# Patient Record
Sex: FEMALE | Race: BLACK OR AFRICAN AMERICAN | NOT HISPANIC OR LATINO | URBAN - METROPOLITAN AREA
[De-identification: names, ages, dates, MRNs, and addresses within clinical notes are randomized per-mention and may not be internally consistent; named-entity substitution may affect disease eponyms.]

---

## 2023-06-08 ENCOUNTER — APPOINTMENT (OUTPATIENT)
Dept: LAB | Facility: LAB | Age: 28
End: 2023-06-08

## 2023-06-08 LAB
CLUE CELLS: PRESENT
HEPATITIS B VIRUS SURFACE AG PRESENCE IN SERUM: NONREACTIVE
HEPATITIS C VIRUS AB PRESENCE IN SERUM: NONREACTIVE
HIV 1/ 2 AG/AB SCREEN: NONREACTIVE
NUGENT SCORE: 8
SYPHILIS TOTAL AB: NONREACTIVE
YEAST: ABNORMAL

## 2023-06-09 LAB
CHLAMYDIA TRACH., AMPLIFIED: NEGATIVE
N. GONORRHEA, AMPLIFIED: NEGATIVE
TRICHOMONAS VAGINALIS: NEGATIVE

## 2024-02-12 ENCOUNTER — HOSPITAL ENCOUNTER (EMERGENCY)
Facility: HOSPITAL | Age: 29
Discharge: HOME | End: 2024-02-12

## 2024-02-12 VITALS
OXYGEN SATURATION: 100 % | BODY MASS INDEX: 25.71 KG/M2 | HEART RATE: 77 BPM | TEMPERATURE: 97.9 F | RESPIRATION RATE: 14 BRPM | HEIGHT: 66 IN | DIASTOLIC BLOOD PRESSURE: 84 MMHG | SYSTOLIC BLOOD PRESSURE: 130 MMHG | WEIGHT: 160 LBS

## 2024-02-12 DIAGNOSIS — Z11.3 SCREEN FOR STD (SEXUALLY TRANSMITTED DISEASE): Primary | ICD-10-CM

## 2024-02-12 LAB
APPEARANCE UR: ABNORMAL
BILIRUB UR STRIP.AUTO-MCNC: NEGATIVE MG/DL
CLUE CELLS SPEC QL WET PREP: NORMAL
COLOR UR: ABNORMAL
FLUAV RNA RESP QL NAA+PROBE: NOT DETECTED
FLUBV RNA RESP QL NAA+PROBE: NOT DETECTED
GLUCOSE UR STRIP.AUTO-MCNC: NORMAL MG/DL
HCV AB SER QL: NONREACTIVE
HIV 1+2 AB+HIV1 P24 AG SERPL QL IA: NONREACTIVE
HOLD SPECIMEN: NORMAL
KETONES UR STRIP.AUTO-MCNC: NEGATIVE MG/DL
LEUKOCYTE ESTERASE UR QL STRIP.AUTO: NEGATIVE
NITRITE UR QL STRIP.AUTO: NEGATIVE
PH UR STRIP.AUTO: 6 [PH]
PREGNANCY TEST URINE, POC: NEGATIVE
PROT UR STRIP.AUTO-MCNC: NEGATIVE MG/DL
RBC # UR STRIP.AUTO: NEGATIVE /UL
SARS-COV-2 RNA RESP QL NAA+PROBE: NOT DETECTED
SP GR UR STRIP.AUTO: 1.01
T VAGINALIS SPEC QL WET PREP: NORMAL
TREPONEMA PALLIDUM IGG+IGM AB [PRESENCE] IN SERUM OR PLASMA BY IMMUNOASSAY: NONREACTIVE
TRICHOMONAS REFLEX COMMENT: NORMAL
UROBILINOGEN UR STRIP.AUTO-MCNC: NORMAL MG/DL
WBC VAG QL WET PREP: NORMAL
YEAST VAG QL WET PREP: NORMAL

## 2024-02-12 PROCEDURE — 86803 HEPATITIS C AB TEST: CPT | Performed by: PHYSICIAN ASSISTANT

## 2024-02-12 PROCEDURE — 81025 URINE PREGNANCY TEST: CPT | Performed by: PHYSICIAN ASSISTANT

## 2024-02-12 PROCEDURE — 36415 COLL VENOUS BLD VENIPUNCTURE: CPT | Performed by: PHYSICIAN ASSISTANT

## 2024-02-12 PROCEDURE — 86780 TREPONEMA PALLIDUM: CPT | Performed by: PHYSICIAN ASSISTANT

## 2024-02-12 PROCEDURE — 96372 THER/PROPH/DIAG INJ SC/IM: CPT

## 2024-02-12 PROCEDURE — 87661 TRICHOMONAS VAGINALIS AMPLIF: CPT | Performed by: PHYSICIAN ASSISTANT

## 2024-02-12 PROCEDURE — 87210 SMEAR WET MOUNT SALINE/INK: CPT | Performed by: PHYSICIAN ASSISTANT

## 2024-02-12 PROCEDURE — 2500000005 HC RX 250 GENERAL PHARMACY W/O HCPCS: Performed by: PHYSICIAN ASSISTANT

## 2024-02-12 PROCEDURE — 99284 EMERGENCY DEPT VISIT MOD MDM: CPT

## 2024-02-12 PROCEDURE — 87636 SARSCOV2 & INF A&B AMP PRB: CPT | Performed by: PHYSICIAN ASSISTANT

## 2024-02-12 PROCEDURE — 99283 EMERGENCY DEPT VISIT LOW MDM: CPT

## 2024-02-12 PROCEDURE — 87800 DETECT AGNT MULT DNA DIREC: CPT | Performed by: PHYSICIAN ASSISTANT

## 2024-02-12 PROCEDURE — 81003 URINALYSIS AUTO W/O SCOPE: CPT | Performed by: PHYSICIAN ASSISTANT

## 2024-02-12 PROCEDURE — 99284 EMERGENCY DEPT VISIT MOD MDM: CPT | Performed by: PHYSICIAN ASSISTANT

## 2024-02-12 PROCEDURE — 2500000004 HC RX 250 GENERAL PHARMACY W/ HCPCS (ALT 636 FOR OP/ED): Performed by: PHYSICIAN ASSISTANT

## 2024-02-12 PROCEDURE — 87389 HIV-1 AG W/HIV-1&-2 AB AG IA: CPT | Performed by: PHYSICIAN ASSISTANT

## 2024-02-12 PROCEDURE — 2500000001 HC RX 250 WO HCPCS SELF ADMINISTERED DRUGS (ALT 637 FOR MEDICARE OP): Performed by: PHYSICIAN ASSISTANT

## 2024-02-12 RX ORDER — ONDANSETRON 4 MG/1
4 TABLET, ORALLY DISINTEGRATING ORAL EVERY 8 HOURS PRN
Qty: 21 TABLET | Refills: 0 | Status: SHIPPED | OUTPATIENT
Start: 2024-02-12

## 2024-02-12 RX ORDER — IBUPROFEN 600 MG/1
600 TABLET ORAL EVERY 6 HOURS PRN
Qty: 20 TABLET | Refills: 0 | Status: SHIPPED | OUTPATIENT
Start: 2024-02-12 | End: 2024-02-17

## 2024-02-12 RX ORDER — ACETAMINOPHEN 325 MG/1
650 TABLET ORAL EVERY 6 HOURS PRN
Qty: 20 TABLET | Refills: 0 | Status: SHIPPED | OUTPATIENT
Start: 2024-02-12 | End: 2024-02-17

## 2024-02-12 RX ORDER — CEFTRIAXONE 500 MG/1
500 INJECTION, POWDER, FOR SOLUTION INTRAMUSCULAR; INTRAVENOUS ONCE
Status: COMPLETED | OUTPATIENT
Start: 2024-02-12 | End: 2024-02-12

## 2024-02-12 RX ORDER — DOXYCYCLINE 100 MG/1
100 TABLET ORAL 2 TIMES DAILY
Qty: 14 TABLET | Refills: 0 | Status: SHIPPED | OUTPATIENT
Start: 2024-02-12 | End: 2024-02-19

## 2024-02-12 RX ORDER — ONDANSETRON 4 MG/1
4 TABLET, ORALLY DISINTEGRATING ORAL ONCE
Status: COMPLETED | OUTPATIENT
Start: 2024-02-12 | End: 2024-02-12

## 2024-02-12 RX ORDER — IBUPROFEN 600 MG/1
600 TABLET ORAL ONCE
Status: COMPLETED | OUTPATIENT
Start: 2024-02-12 | End: 2024-02-12

## 2024-02-12 RX ORDER — DOXYCYCLINE HYCLATE 100 MG
100 TABLET ORAL ONCE
Status: COMPLETED | OUTPATIENT
Start: 2024-02-12 | End: 2024-02-12

## 2024-02-12 RX ADMIN — DOXYCYCLINE HYCLATE 100 MG: 100 TABLET, COATED ORAL at 08:47

## 2024-02-12 RX ADMIN — CEFTRIAXONE SODIUM 500 MG: 500 INJECTION, POWDER, FOR SOLUTION INTRAMUSCULAR; INTRAVENOUS at 08:47

## 2024-02-12 RX ADMIN — IBUPROFEN 600 MG: 600 TABLET, FILM COATED ORAL at 08:04

## 2024-02-12 RX ADMIN — ONDANSETRON 4 MG: 4 TABLET, ORALLY DISINTEGRATING ORAL at 09:28

## 2024-02-12 ASSESSMENT — COLUMBIA-SUICIDE SEVERITY RATING SCALE - C-SSRS
6. HAVE YOU EVER DONE ANYTHING, STARTED TO DO ANYTHING, OR PREPARED TO DO ANYTHING TO END YOUR LIFE?: NO
1. IN THE PAST MONTH, HAVE YOU WISHED YOU WERE DEAD OR WISHED YOU COULD GO TO SLEEP AND NOT WAKE UP?: NO
2. HAVE YOU ACTUALLY HAD ANY THOUGHTS OF KILLING YOURSELF?: NO

## 2024-02-12 ASSESSMENT — PAIN - FUNCTIONAL ASSESSMENT: PAIN_FUNCTIONAL_ASSESSMENT: 0-10

## 2024-02-12 ASSESSMENT — PAIN SCALES - GENERAL: PAINLEVEL_OUTOF10: 9

## 2024-02-12 ASSESSMENT — PAIN DESCRIPTION - LOCATION: LOCATION: LEG

## 2024-02-12 NOTE — ED TRIAGE NOTES
Pt came in for an STD check after having stabbing vaginal pain and white discharge for about 1.5 weeks. Pt reports having muscle aches and chills that started about 5 days ago but has worsened today.

## 2024-02-12 NOTE — ED PROVIDER NOTES
"This is a 28-year-old female with no significant past medical history presents to the ED with vaginal symptoms for the past 10 days.  She endorses having vaginal discharge that she states has been thick and white in nature, vaginal pain that is worse with intercourse, pelvic cramping.  She also states over the past 3 to 4 days she developed generalized malaise and bodyaches as well as subjective fevers and chills, nasal congestion, rhinorrhea, and mild dry cough.  She denies any known sick contacts.  She is sexually active with 1 male partner and does not use protection but is concerned that he may have other partners.  She denies any known exposure to STDs.  She did not take anything at home for her symptoms.  She states that she went to an urgent care and was told that she did not have bacterial vaginosis, however did not hear about any of her other results and is still concerned she has an STD.  She is requesting blood testing for STDs.      History provided by:  Patient   used: No             Visit Vitals  /84 (BP Location: Left arm)   Pulse 77   Temp 36.6 °C (97.9 °F) (Oral)   Resp 14   Ht 1.676 m (5' 6\")   Wt 72.6 kg (160 lb)   SpO2 100%   BMI 25.82 kg/m²   BSA 1.84 m²          Physical Exam     Physical exam:    General: Vitals noted, no distress. Afebrile.    EENT: PERRL, EOM's intact. Eye lids without lesions. No scleral icterus. Normal phonation. Nares patent. MMM.     Cardiac: Regular, rate, rhythm, no murmur.    Pulmonary: Lungs clear bilaterally with good aeration. No adventitious breath sounds.    Abdomen: Mild tenderness palpation suprapubic region.  No rebound or guarding.  No tenderness at McBurney's point.  Soft, nonsurgical. No peritoneal signs. Normoactive bowel sounds. No CVA tenderness.     Pelvic Exam: Chaperone present. External genitalia normal. No rashes or lesions. Speculum exam shows no lesions on the cervix.  Small amount of thick white discharge within the " vagina. no bleeding. Closed cervical os. Bimanual exam shows no adnexal pain or mass. No cervical motion tenderness.    Extremities: No peripheral edema. Full range of motion. Moves all extremities freely.     Skin: No rash. Warm and dry. No discoloration noted.     Neuro: No focal neurologic deficits noted.  Pt is A&O x3, speech is clear, moves all extremities independently sensation is intact.            Labs Reviewed   URINALYSIS WITH REFLEX CULTURE AND MICROSCOPIC - Abnormal       Result Value    Color, Urine Light-Yellow      Appearance, Urine Turbid (*)     Specific Gravity, Urine 1.012      pH, Urine 6.0      Protein, Urine NEGATIVE      Glucose, Urine Normal      Blood, Urine NEGATIVE      Ketones, Urine NEGATIVE      Bilirubin, Urine NEGATIVE      Urobilinogen, Urine Normal      Nitrite, Urine NEGATIVE      Leukocyte Esterase, Urine NEGATIVE     SARS-COV-2 AND INFLUENZA A/B PCR - Normal    Flu A Result Not Detected      Flu B Result Not Detected      Coronavirus 2019, PCR Not Detected      Narrative:     This assay has received FDA Emergency Use Authorization (EUA) and  is only authorized for the duration of time that circumstances exist to justify the authorization of the emergency use of in vitro diagnostic tests for the detection of SARS-CoV-2 virus and/or diagnosis of COVID-19 infection under section 564(b)(1) of the Act, 21 U.S.C. 360bbb-3(b)(1). Testing for SARS-CoV-2 is only recommended for patients who meet current clinical and/or epidemiological criteria as defined by federal, state, or local public health directives. This assay is an in vitro diagnostic nucleic acid amplification test for the qualitative detection of SARS-CoV-2, Influenza A, and Influenza B from nasopharyngeal specimens and has been validated for use at [facility]. Negative results do not preclude COVID-19 infections or Influenza A/B infections, and should not be used as the sole basis for diagnosis,  treatment, or other management decisions. If Influenza A/B and RSV PCR results are negative, testing for Parainfluenza virus, Adenovirus and Metapneumovirus is routinely performed for [facility] pediatric oncology and intensive care inpatients, and is available on other patients by placing an add-on request.    HIV 1/2 ANTIGEN/ANTIBODY SCREEN WIH REFLEX TO CONFIRMATION - Normal    HIV 1/2 Antigen/Antibody Screen with Reflex to Confirmation Nonreactive      Narrative:     HIV Ag/Ab screen is performed using the Siemens [employer] HIV Ag/Ab Combo assay which detects the presence of HIV p24 antigen as well as antibodies to HIV-1 (Group M and O) and HIV-2.  No laboratory evidence of HIV infection. If acute HIV infection is suspected, consider testing for HIV RNA by PCR (viral load).   HEPATITIS C ANTIBODY - Normal    Hepatitis C AB Nonreactive     POCT PREGNANCY, URINE - Normal    Preg Test, Ur Negative     TRICHOMONAS WET PREP REFLEX TO PCR    Trichomonas None Seen      Clue Cells None Seen      Yeast None Seen      WBC 3-8      Trichomonas reflex comment        Value: Trichomonas was not seen by wet prep. Reflex Trichomonas vaginalis by Amplified Detection.   SYPHILIS SCREENING WITH REFLEX   C. TRACHOMATIS + N. GONORRHOEAE, AMPLIFIED   URINALYSIS WITH REFLEX CULTURE AND MICROSCOPIC    Narrative:     The following orders were created for panel order Urinalysis with Reflex Culture and Microscopic.  Procedure                               Abnormality         Status                     ---------                               -----------         ------                     Urinalysis with Reflex C...[[id number]]  Abnormal            Final result               Extra Urine Gray Tube[[id number]]                            In process                   Please view results for these tests on the individual orders.   EXTRA URINE GRAY TUBE   TRICH VAGINALIS, AMPLIFIED       No orders to display         ED Course & MDM     Medical Decision  Making  This is a 28-year-old female with no significant past medical history presents to the ED with vaginal symptoms as well as body aches and flulike symptoms.  Vital stable upon arrival to the ED.  On examination she is overall well-appearing.  Lungs clear to auscultation.  Abdomen soft with minimal tenderness palpation to her suprapubic region.  No rebound or guarding.  Pelvic examination performed chaperone present and showed a moderate amount of thick white discharge within the vagina.  Wet prep as well as GC chlamydia swabs obtained during pelvic examination.  No cervical motion tenderness.  No adnexal pain or masses palpated.  Based on pelvic examination I have low suspicion for PID/TOA at this time.  Blood sample also obtained to screen for HIV, hepatitis C, syphilis.  Viral swab obtained to screen for flu/COVID.  Viral swab negative for flu and COVID.  Wet prep negative.  Urinalysis not concerning for UTI. Urine pregnancy test was negative.  Patient elected to be empirically treated for STDs and was given IM Rocephin as well as her first dose of doxycycline.  She was written a prescription for doxycycline to go home with.  Following the doxycycline she did endorse some nausea but had no vomiting was ordered ODT Zofran.  She was written a prescription for ODT Zofran, doxycycline, Tylenol and Motrin for her symptoms at home and was advised to follow with her OB/GYN.  She was agreeable to this plan.  She was given signs symptoms to return to the ED with and was discharged in stable condition.  She was told that she would receive a phone call in 2-3 business days with any positive results on her STD testing.    Amount and/or Complexity of Data Reviewed  Labs: ordered.    Risk  Prescription drug management.         ED Course as of 02/12/24 1004   Mon Feb 12, 2024   0840 Trichomonas Wet Prep Reflex to PCR  Wet prep negative for BV, yeast, and trichomonas [AW]      ED Course User Index  [AW] Crystal Abreu,  TINO         Diagnoses as of 02/12/24 1004   Screen for STD (sexually transmitted disease)       Procedures    FADIA Rosas, TINO Abreu PA-C  02/12/24 1006

## 2024-02-13 LAB
C TRACH RRNA SPEC QL NAA+PROBE: NEGATIVE
N GONORRHOEA DNA SPEC QL PROBE+SIG AMP: NEGATIVE
T VAGINALIS RRNA SPEC QL NAA+PROBE: NEGATIVE

## 2024-02-26 ENCOUNTER — DOCUMENTATION (OUTPATIENT)
Dept: EMERGENCY MEDICINE | Facility: HOSPITAL | Age: 29
End: 2024-02-26

## 2024-02-26 NOTE — PROGRESS NOTES
2/26/2024     Test:   Discussed HIV and HCV testing with the patient in the ED.   Patient received HIV and HCV test today    Test Result:  HIV Negative  HCV Negative  SYPHILIS Negative    Result notification:  Patient was notified of their test results on 02/26/24 via Telephone (after verifying 3 identifiers)    Follow-up plan:   Patient was referred to Other on [Date]  Patient has appointment at [clinic name] on [Date]  Barriers to attending appointment: [free text, none]  Missed appointments: [unfilled]     Signed  RON Neil   HIV/HCV [facility]  Certified Community Health Worker - Research  Please contact me via email or [facility]EXhart with questions or phone [phone]

## 2024-11-12 ENCOUNTER — HOSPITAL ENCOUNTER (EMERGENCY)
Facility: HOSPITAL | Age: 29
Discharge: HOME | End: 2024-11-12
Payer: MEDICAID

## 2024-11-12 VITALS
BODY MASS INDEX: 28.93 KG/M2 | DIASTOLIC BLOOD PRESSURE: 71 MMHG | SYSTOLIC BLOOD PRESSURE: 118 MMHG | OXYGEN SATURATION: 98 % | HEIGHT: 66 IN | WEIGHT: 180 LBS | RESPIRATION RATE: 16 BRPM | HEART RATE: 94 BPM | TEMPERATURE: 98.6 F

## 2024-11-12 DIAGNOSIS — J45.909 UNCOMPLICATED ASTHMA, UNSPECIFIED ASTHMA SEVERITY, UNSPECIFIED WHETHER PERSISTENT (HHS-HCC): ICD-10-CM

## 2024-11-12 DIAGNOSIS — J40 BRONCHITIS: Primary | ICD-10-CM

## 2024-11-12 LAB
ATRIAL RATE: 98 BPM
P AXIS: 78 DEGREES
P OFFSET: 201 MS
P ONSET: 149 MS
PR INTERVAL: 142 MS
Q ONSET: 220 MS
QRS COUNT: 16 BEATS
QRS DURATION: 80 MS
QT INTERVAL: 298 MS
QTC CALCULATION(BAZETT): 380 MS
QTC FREDERICIA: 351 MS
R AXIS: 67 DEGREES
T AXIS: -7 DEGREES
T OFFSET: 369 MS
VENTRICULAR RATE: 98 BPM

## 2024-11-12 PROCEDURE — 2500000002 HC RX 250 W HCPCS SELF ADMINISTERED DRUGS (ALT 637 FOR MEDICARE OP, ALT 636 FOR OP/ED): Mod: SE | Performed by: NURSE PRACTITIONER

## 2024-11-12 PROCEDURE — 93010 ELECTROCARDIOGRAM REPORT: CPT

## 2024-11-12 PROCEDURE — 2500000004 HC RX 250 GENERAL PHARMACY W/ HCPCS (ALT 636 FOR OP/ED): Mod: SE | Performed by: NURSE PRACTITIONER

## 2024-11-12 PROCEDURE — 99285 EMERGENCY DEPT VISIT HI MDM: CPT

## 2024-11-12 PROCEDURE — 99284 EMERGENCY DEPT VISIT MOD MDM: CPT | Performed by: NURSE PRACTITIONER

## 2024-11-12 PROCEDURE — 94640 AIRWAY INHALATION TREATMENT: CPT | Mod: 59

## 2024-11-12 RX ORDER — PREDNISONE 20 MG/1
40 TABLET ORAL ONCE
Status: COMPLETED | OUTPATIENT
Start: 2024-11-12 | End: 2024-11-12

## 2024-11-12 RX ORDER — ALBUTEROL SULFATE 90 UG/1
2 INHALANT RESPIRATORY (INHALATION) EVERY 6 HOURS PRN
Qty: 18 G | Refills: 0 | Status: SHIPPED | OUTPATIENT
Start: 2024-11-12 | End: 2025-11-12

## 2024-11-12 RX ORDER — BENZONATATE 100 MG/1
100 CAPSULE ORAL 3 TIMES DAILY PRN
Qty: 21 CAPSULE | Refills: 0 | Status: SHIPPED | OUTPATIENT
Start: 2024-11-12 | End: 2024-11-19

## 2024-11-12 RX ORDER — PREDNISONE 20 MG/1
40 TABLET ORAL DAILY
Qty: 8 TABLET | Refills: 0 | Status: SHIPPED | OUTPATIENT
Start: 2024-11-12

## 2024-11-12 RX ORDER — IPRATROPIUM BROMIDE AND ALBUTEROL SULFATE 2.5; .5 MG/3ML; MG/3ML
3 SOLUTION RESPIRATORY (INHALATION) EVERY 20 MIN
Status: COMPLETED | OUTPATIENT
Start: 2024-11-12 | End: 2024-11-12

## 2024-11-12 ASSESSMENT — COLUMBIA-SUICIDE SEVERITY RATING SCALE - C-SSRS
2. HAVE YOU ACTUALLY HAD ANY THOUGHTS OF KILLING YOURSELF?: NO
6. HAVE YOU EVER DONE ANYTHING, STARTED TO DO ANYTHING, OR PREPARED TO DO ANYTHING TO END YOUR LIFE?: NO
1. IN THE PAST MONTH, HAVE YOU WISHED YOU WERE DEAD OR WISHED YOU COULD GO TO SLEEP AND NOT WAKE UP?: NO

## 2024-11-12 ASSESSMENT — LIFESTYLE VARIABLES
EVER HAD A DRINK FIRST THING IN THE MORNING TO STEADY YOUR NERVES TO GET RID OF A HANGOVER: NO
EVER FELT BAD OR GUILTY ABOUT YOUR DRINKING: NO
TOTAL SCORE: 0
HAVE PEOPLE ANNOYED YOU BY CRITICIZING YOUR DRINKING: NO
HAVE YOU EVER FELT YOU SHOULD CUT DOWN ON YOUR DRINKING: NO

## 2024-11-12 ASSESSMENT — PAIN - FUNCTIONAL ASSESSMENT: PAIN_FUNCTIONAL_ASSESSMENT: 0-10

## 2024-11-12 NOTE — ED TRIAGE NOTES
Pt to ed with complaints of sob x 1 week. Pt states she has been introduced to a cold from a friend and has been feeling sick ever since. Pt has hx of chronic bronchitis and gets this usually every year around this time. Pt breath sounds clear, vitals stable, airway intact. Pt denies cp, has non productive cough

## 2024-11-12 NOTE — ED PROVIDER NOTES
HPI   Chief Complaint   Patient presents with    Shortness of Breath       HPI  This is a 29 year old female with past medical history significant for bronchitis presents to the ED with wheezing and dyspnea for the past couple of days.   She does not have an established diagnosis asthma. She has had clear sputum and denies fever , chills, nausea, vomiting, sick contact and and/or recent travel.       Patient History   Past Medical History:   Diagnosis Date    Contact with and (suspected) exposure to infections with a predominantly sexual mode of transmission 2018    Exposure to STD    Encounter for contraceptive management, unspecified 2018    Contraception management    Encounter for gynecological examination (general) (routine) without abnormal findings 2017    Encounter for gynecological examination without abnormal finding    Encounter for initial prescription of injectable contraceptive 2017    Encounter for initial prescription of injectable contraceptive    Encounter for screening for infections with a predominantly sexual mode of transmission 2017    Screening for STD (sexually transmitted disease)    Encounter for surveillance of injectable contraceptive 10/11/2017    Encounter for Depo-Provera contraception    Gonococcal infection, unspecified 2018    Gonorrhea in female    Other specified health status     No pertinent past medical history    Personal history of other infectious and parasitic diseases 2018    History of candidiasis     Past Surgical History:   Procedure Laterality Date     SECTION, CLASSIC  2017     Section     No family history on file.  Social History     Tobacco Use    Smoking status: Not on file    Smokeless tobacco: Not on file   Substance Use Topics    Alcohol use: Not on file    Drug use: Not on file       Physical Exam   ED Triage Vitals [24 1025]   Temperature Heart Rate Respirations BP   37 °C (98.6 °F) (!)  101 16 121/80      Pulse Ox Temp Source Heart Rate Source Patient Position   98 % Tympanic -- --      BP Location FiO2 (%)     -- --       Physical Exam  Constitutional:       Appearance: She is well-developed.   HENT:      Head: Normocephalic and atraumatic.   Cardiovascular:      Rate and Rhythm: Tachycardia present.   Pulmonary:      Breath sounds: Wheezing present.   Abdominal:      Palpations: Abdomen is soft.   Musculoskeletal:         General: Normal range of motion.      Cervical back: Normal range of motion.   Skin:     General: Skin is warm and dry.   Neurological:      General: No focal deficit present.      Mental Status: She is alert and oriented to person, place, and time.   Psychiatric:         Mood and Affect: Mood normal.         Behavior: Behavior normal.           ED Course & MDM   Diagnoses as of 11/12/24 1159   Bronchitis   Uncomplicated asthma, unspecified asthma severity, unspecified whether persistent ([facility])                 No data recorded     [place] Coma Scale Score: 15 (11/12/24 1049 : Audie Polk RN)                           Medical Decision Making  This is a 29 year old female with past medical history significant for bronchitis presents to the ED with wheezing and dyspnea for the past couple of days.   She does not have an established diagnosis asthma. She has had clear sputum and denies fever , chills, nausea, vomiting, sick contact and and/or recent travel.   She was given three Duoneb and 40 mg of Prednisone.     She felt significantly better after her aerosol treatments and Prednisone. Her lungs also sounded much improved with increase air movement,   She was discharged home with Rx's for Prednisone 40 mg x 4 more days, Albuterol MDI q6 as needed and Tessalon. She was instructed to follow up with a primary care for management and evaluation of what I think maybe asthma.     Procedure  Procedures     Estefania Fortune, APRN-CNP, DNP  11/12/24 4585

## 2024-12-18 ENCOUNTER — OFFICE VISIT (OUTPATIENT)
Dept: URGENT CARE | Age: 29
End: 2024-12-18
Payer: MEDICAID

## 2024-12-18 VITALS
WEIGHT: 170 LBS | HEIGHT: 66 IN | DIASTOLIC BLOOD PRESSURE: 73 MMHG | TEMPERATURE: 98.5 F | RESPIRATION RATE: 18 BRPM | BODY MASS INDEX: 27.32 KG/M2 | SYSTOLIC BLOOD PRESSURE: 112 MMHG

## 2024-12-18 DIAGNOSIS — N89.8 VAGINAL ODOR: ICD-10-CM

## 2024-12-18 LAB
POC APPEARANCE, URINE: ABNORMAL
POC BILIRUBIN, URINE: NEGATIVE
POC BLOOD, URINE: ABNORMAL
POC COLOR, URINE: ABNORMAL
POC GLUCOSE, URINE: NEGATIVE MG/DL
POC KETONES, URINE: NEGATIVE MG/DL
POC LEUKOCYTES, URINE: NEGATIVE
POC NITRITE,URINE: NEGATIVE
POC PH, URINE: 6 PH
POC PROTEIN, URINE: NEGATIVE MG/DL
POC SPECIFIC GRAVITY, URINE: 1.02
POC UROBILINOGEN, URINE: 1 EU/DL
PREGNANCY TEST URINE, POC: NEGATIVE

## 2024-12-18 PROCEDURE — 87591 N.GONORRHOEAE DNA AMP PROB: CPT

## 2024-12-18 PROCEDURE — 99204 OFFICE O/P NEW MOD 45 MIN: CPT | Performed by: PHYSICIAN ASSISTANT

## 2024-12-18 PROCEDURE — 81003 URINALYSIS AUTO W/O SCOPE: CPT | Performed by: PHYSICIAN ASSISTANT

## 2024-12-18 PROCEDURE — 3008F BODY MASS INDEX DOCD: CPT | Performed by: PHYSICIAN ASSISTANT

## 2024-12-18 PROCEDURE — 87205 SMEAR GRAM STAIN: CPT

## 2024-12-18 PROCEDURE — 87491 CHLMYD TRACH DNA AMP PROBE: CPT

## 2024-12-18 PROCEDURE — 81025 URINE PREGNANCY TEST: CPT | Performed by: PHYSICIAN ASSISTANT

## 2024-12-18 RX ORDER — METRONIDAZOLE 500 MG/1
500 TABLET ORAL 2 TIMES DAILY
Qty: 14 TABLET | Refills: 0 | Status: SHIPPED | OUTPATIENT
Start: 2024-12-18 | End: 2024-12-25

## 2024-12-18 RX ORDER — FLUCONAZOLE 150 MG/1
150 TABLET ORAL ONCE
Qty: 1 TABLET | Refills: 0 | Status: SHIPPED | OUTPATIENT
Start: 2024-12-18 | End: 2024-12-18

## 2024-12-18 ASSESSMENT — ENCOUNTER SYMPTOMS
RESPIRATORY NEGATIVE: 1
ABDOMINAL PAIN: 1
CARDIOVASCULAR NEGATIVE: 1
ENDOCRINE NEGATIVE: 1
HEMATOLOGIC/LYMPHATIC NEGATIVE: 1
BACK PAIN: 0
DYSURIA: 0
CONSTITUTIONAL NEGATIVE: 1
ALLERGIC/IMMUNOLOGIC NEGATIVE: 1
NEUROLOGICAL NEGATIVE: 1
EYES NEGATIVE: 1
PSYCHIATRIC NEGATIVE: 1

## 2024-12-19 LAB
BACTERIAL VAGINOSIS VAG-IMP: NORMAL
C TRACH RRNA SPEC QL NAA+PROBE: NEGATIVE
CLUE CELLS VAG LPF-#/AREA: NORMAL /[LPF]
N GONORRHOEA DNA SPEC QL PROBE+SIG AMP: NEGATIVE
NUGENT SCORE: 6
YEAST VAG WET PREP-#/AREA: NORMAL

## 2024-12-19 NOTE — PROGRESS NOTES
Subjective   Patient ID: Quyen Gilbert is a 29 y.o. female. They present today with a chief complaint of Other (Took a shower this evening and noticed an odor).    History of Present Illness    History provided by:  Patient   used: No      This is a female pt here for vaginal odor, lower abdominal cramping, and slight vaginal itching noted tonight. Denies back pain, dysuria, fever, genital rash or lesions. Vaginal spotting for months after off depoprovera.   Past Medical History  Allergies as of 2024    (No Known Allergies)       (Not in a hospital admission)       Past Medical History:   Diagnosis Date    Contact with and (suspected) exposure to infections with a predominantly sexual mode of transmission 2018    Exposure to STD    Encounter for contraceptive management, unspecified 2018    Contraception management    Encounter for gynecological examination (general) (routine) without abnormal findings 2017    Encounter for gynecological examination without abnormal finding    Encounter for initial prescription of injectable contraceptive 2017    Encounter for initial prescription of injectable contraceptive    Encounter for screening for infections with a predominantly sexual mode of transmission 2017    Screening for STD (sexually transmitted disease)    Encounter for surveillance of injectable contraceptive 10/11/2017    Encounter for Depo-Provera contraception    Gonococcal infection, unspecified 2018    Gonorrhea in female    Other specified health status     No pertinent past medical history    Personal history of other infectious and parasitic diseases 2018    History of candidiasis       Past Surgical History:   Procedure Laterality Date     SECTION, CLASSIC  2017     Section        reports that she has never smoked. She does not have any smokeless tobacco history on file.    Review of Systems  Review of  "Systems   Constitutional: Negative.    HENT: Negative.     Eyes: Negative.    Respiratory: Negative.     Cardiovascular: Negative.    Gastrointestinal:  Positive for abdominal pain.   Endocrine: Negative.    Genitourinary:  Positive for pelvic pain and vaginal bleeding. Negative for dysuria, genital sores and vaginal discharge.   Musculoskeletal:  Negative for back pain.   Skin: Negative.    Allergic/Immunologic: Negative.    Neurological: Negative.    Hematological: Negative.    Psychiatric/Behavioral: Negative.     All other systems reviewed and are negative.       Objective    Vitals:    12/18/24 1910   BP: 112/73   Resp: 18   Temp: 36.9 °C (98.5 °F)   TempSrc: Oral   Weight: 77.1 kg (170 lb)   Height: 1.676 m (5' 6\")     No LMP recorded.    Physical Exam  Vitals and nursing note reviewed.   Constitutional:       Appearance: Normal appearance.   HENT:      Head: Normocephalic and atraumatic.   Cardiovascular:      Rate and Rhythm: Normal rate and regular rhythm.   Pulmonary:      Effort: Pulmonary effort is normal.      Breath sounds: Normal breath sounds.   Abdominal:      Palpations: Abdomen is soft.      Tenderness: There is no abdominal tenderness.   Genitourinary:     Comments: Exam deferred  Skin:     General: Skin is warm and dry.   Neurological:      General: No focal deficit present.      Mental Status: She is alert and oriented to person, place, and time.   Psychiatric:         Mood and Affect: Mood normal.         Behavior: Behavior normal.       UA dip-blood positive, negative leuks and nitrites  UCG-negative    Procedures    Point of Care Test & Imaging Results from this visit  No results found for this visit on 12/18/24.   No results found.    Diagnostic study results (if any) were reviewed by Aliyah Diana PA-C.    Assessment/Plan   Allergies, medications, history, and pertinent labs/EKGs/Imaging reviewed by Aliyah Diana PA-C.       Orders and Diagnoses  Diagnoses and all orders for this " visit:  Vaginal odor  -     POCT pregnancy, urine manually resulted  -     POCT UA Automated manually resulted  -     Vaginitis Gram Stain For Bacterial Vaginosis + Yeast  -     C. trachomatis / N. gonorrhoeae, Amplified  -     metroNIDAZOLE (Flagyl) 500 mg tablet; Take 1 tablet (500 mg) by mouth 2 times a day for 7 days.  -     fluconazole (Diflucan) 150 mg tablet; Take 1 tablet (150 mg) by mouth 1 time for 1 dose.    Plan:  Meds as above  No douche  Pcp or gyn follow up this week if not improving or worsening  ER visit anytime 24/7 for acute worsening or changing condition    Patient disposition: Home    Electronically signed by Aliyah Diana PA-C  7:25 PM

## 2024-12-19 NOTE — PATIENT INSTRUCTIONS
No douche  Pcp or gyn follow up this week if not improving or worsening  ER visit anytime 24/7 for acute worsening or changing condition

## 2025-01-17 ENCOUNTER — APPOINTMENT (OUTPATIENT)
Dept: PRIMARY CARE | Facility: CLINIC | Age: 30
End: 2025-01-17
Payer: MEDICAID

## 2025-01-17 VITALS
DIASTOLIC BLOOD PRESSURE: 75 MMHG | BODY MASS INDEX: 28.38 KG/M2 | SYSTOLIC BLOOD PRESSURE: 108 MMHG | TEMPERATURE: 98.6 F | OXYGEN SATURATION: 93 % | HEART RATE: 100 BPM | WEIGHT: 176.6 LBS | HEIGHT: 66 IN

## 2025-01-17 DIAGNOSIS — J45.909 UNCOMPLICATED ASTHMA, UNSPECIFIED ASTHMA SEVERITY, UNSPECIFIED WHETHER PERSISTENT (HHS-HCC): ICD-10-CM

## 2025-01-17 DIAGNOSIS — N89.8 VAGINAL DISCHARGE: Primary | ICD-10-CM

## 2025-01-17 DIAGNOSIS — N76.1 CHRONIC VAGINITIS: ICD-10-CM

## 2025-01-17 DIAGNOSIS — R87.613 HSIL (HIGH GRADE SQUAMOUS INTRAEPITHELIAL LESION) ON PAP SMEAR OF CERVIX: ICD-10-CM

## 2025-01-17 DIAGNOSIS — Z72.51 HIGH RISK HETEROSEXUAL BEHAVIOR: ICD-10-CM

## 2025-01-17 DIAGNOSIS — J40 BRONCHITIS: ICD-10-CM

## 2025-01-17 DIAGNOSIS — A59.01 TRICHOMONAL VULVOVAGINITIS: ICD-10-CM

## 2025-01-17 DIAGNOSIS — N91.2 AMENORRHEA: ICD-10-CM

## 2025-01-17 PROBLEM — A63.0 CONDYLOMA ACUMINATUM: Status: RESOLVED | Noted: 2025-01-17 | Resolved: 2025-01-17

## 2025-01-17 LAB
POC APPEARANCE, URINE: CLEAR
POC BILIRUBIN, URINE: NEGATIVE
POC BLOOD, URINE: NEGATIVE
POC COLOR, URINE: YELLOW
POC GLUCOSE, URINE: NEGATIVE MG/DL
POC KETONES, URINE: NEGATIVE MG/DL
POC LEUKOCYTES, URINE: NEGATIVE
POC NITRITE,URINE: NEGATIVE
POC PH, URINE: 6.5 PH
POC PROTEIN, URINE: NEGATIVE MG/DL
POC SPECIFIC GRAVITY, URINE: 1.02
POC UROBILINOGEN, URINE: 4 EU/DL
PREGNANCY TEST URINE, POC: NEGATIVE

## 2025-01-17 PROCEDURE — 81025 URINE PREGNANCY TEST: CPT

## 2025-01-17 PROCEDURE — 81003 URINALYSIS AUTO W/O SCOPE: CPT

## 2025-01-17 RX ORDER — MEDROXYPROGESTERONE ACETATE 150 MG/ML
150 INJECTION, SUSPENSION INTRAMUSCULAR
COMMUNITY
Start: 2024-07-02 | End: 2025-06-27

## 2025-01-17 ASSESSMENT — PATIENT HEALTH QUESTIONNAIRE - PHQ9
1. LITTLE INTEREST OR PLEASURE IN DOING THINGS: NOT AT ALL
SUM OF ALL RESPONSES TO PHQ9 QUESTIONS 1 AND 2: 0
2. FEELING DOWN, DEPRESSED OR HOPELESS: NOT AT ALL

## 2025-01-17 ASSESSMENT — PAIN SCALES - GENERAL: PAINLEVEL_OUTOF10: 0-NO PAIN

## 2025-01-17 ASSESSMENT — COLUMBIA-SUICIDE SEVERITY RATING SCALE - C-SSRS
1. IN THE PAST MONTH, HAVE YOU WISHED YOU WERE DEAD OR WISHED YOU COULD GO TO SLEEP AND NOT WAKE UP?: NO
2. HAVE YOU ACTUALLY HAD ANY THOUGHTS OF KILLING YOURSELF?: NO
6. HAVE YOU EVER DONE ANYTHING, STARTED TO DO ANYTHING, OR PREPARED TO DO ANYTHING TO END YOUR LIFE?: NO

## 2025-01-17 ASSESSMENT — ENCOUNTER SYMPTOMS
DEPRESSION: 0
OCCASIONAL FEELINGS OF UNSTEADINESS: 0
LOSS OF SENSATION IN FEET: 0

## 2025-01-17 NOTE — PROGRESS NOTES
29 year old female here to establish care with a new PCP. patient doing well with no acute complaints at this time      HPI:  #Chronic BV  - for about one year, symptoms persistent  - permanent thick white discharge and itching  - medications improve symptoms for a few days but then continue, symptoms usually either start after having sexual activity, or a few weeks after finishing antibiotics  - last sexually active one week ago, no intercourse  - has pain with sexual intercourse  - has never been on prophylaxis  - LMP - stopped depo in December and stopped bleeding since then     Previous PCP: can't remember the name, was about one year ago    OBSTETRIC HISTORY:  G/P: /  Abortions: 3   Vaginal Delivery: 1   Section: 1  STI: hx of trich, gonorrhea, and chlamydia, self and partner treated    GYNECOLOGICAL HISTORY:  LMP: Dec 13th  Breast/Ovarian/Uterine CA:   Last PAP: 2024, negative  History of Abnormal PAP: HGSIL in , s/p LEEP    PAST MEDICAL HISTORY:  - chronic BV, HGSIL s/p LEEP in  with negative pap     PAST SURGICAL HISTORY:  - c/s     FAMILY HISTORY:  - son: wilm's tumor 3 years ago, successfully treated    SOCIAL HISTORY:  Tobacco: never  ETOH: 2-3 shots on weekends  Drug: denied  Sexual hx: as above  Occupation: ; no housing/food insecurity   Leisure: travelling and trying new places     ALLERGIES:  - NKDA    PAST PREVENTATIVE CARE:  - not utd on vaccine    REVIEW OF SYSTEMS:   No fevers, chills, weight is stable  No sores, ulcers, rashes, skin lesions  No HA, SZ, syncope, stroke, TIA  No CP, chest pressure  No cough, SOB  no ABD pain  No BRBPR, melena, hematuria  No bleeding  no edema, no calf pain    10 point review of systems negative except HPI    PHYSICAL EXAMINATION:   Gen: NAD, non-toxic  HEENT: WNL  Chest: CTABL  CVS: regular without murmur  Abd: soft, NT/ND,   Ext: no edema, nontender  Skin: Dry, warm, good condition without wounds or lesions or rashes  Neuro:  grossly normal, CN intact, ERIC x 4  Mood and affect appropriate   exam done with chaperone present, no cervical motion tenderness; Speculum exam with normal appearing cervix with thick white discharge present at os; no evidence of blood    ASSESSMENT/PLAN:  28 yo F with a PMH of chronic BV presenting to [facility] care. Medication and problem list reconciled.     Chronic BV  Vaginal discharge (Primary)  -     POCT Pregnancy, Urine manually resulted negative   -     POCT UA Automated manually resulted - unremarkable  -     HIV 1/2 Antigen/Antibody Screen with Reflex to Confirmation; Future  -     Syphilis Screen with Reflex; Future  -     Hepatitis C antibody; Future  -     Hepatitis B surface antibody; Future  -     C. trachomatis / N. gonorrhoeae, Amplified; Future  -     Trichomonas vaginalis, Amplified; Future  -     Wet Prep with Trichomonas Reflex If Neg; Future  -     Vaginitis Gram Stain For Bacterial Vaginosis + Yeast  - Given chronicity and recurrence despite tx and prophylaxis, will refer to obgyn for further evaluation and treatment  - Plan to re-treat if positive, but will re-initiate suppressive therapy regardless of result    Bronchitis  Uncomplicated asthma, unspecified asthma severity, unspecified whether persistent ([facility])      RTC in 2-3 mos for f/up and RHM.   Patient discussed with Dr. Kent.  Allie Jorgensen MD  Family Medicine Resident.

## 2025-01-20 PROBLEM — Z72.51 HIGH RISK HETEROSEXUAL BEHAVIOR: Status: RESOLVED | Noted: 2025-01-17 | Resolved: 2025-01-20

## 2025-01-20 NOTE — PROGRESS NOTES
"Quyen Gilbert is a 29 y.o. here for vaginal itching. She believes that she has recurrent BV. She states that she self-treats herself with boric acid after she has intercourse due to the odor that bothers her.     Patient states that when she has her symptoms of BV she has itching and \"garbage\" odor.     Vaginal Swab results for :  : still pending   : no BV/yeast  24: BV  24: yeast, BV  24: no yeast   24: no yeast     Happy with no birth control.     GynHx:  No LMP recorded (within days). --> patient just stopped Depo     Current contraception: None  HPV vaccination: Yes x3  Last pap:  24 WNL, no HPV   21 LEEP WNL  21 LEEP with + margins   21 JAYDEN II HSIL colpo   21 HSIL HPV+  20 LSIL, HPV+  STI testing today: Yes previously ordered on      OB History          5    Para   2    Term   2            AB   3    Living   2         SAB   0    IAB   3    Ectopic        Multiple        Live Births   2                  Past Medical History:   Diagnosis Date    Abnormal Pap smear of cervix     Condyloma acuminatum 2025    Hx of gonorrhea 2018    Hx of trichomoniasis     Personal history of other infectious and parasitic diseases 2018    History of candidiasis       Past Surgical History:   Procedure Laterality Date     SECTION, LOW TRANSVERSE  2017       Objective   BP 95/65   Pulse 93   Wt 80.7 kg (177 lb 14.4 oz)   LMP  (Within Days)   BMI 28.71 kg/m²     Physical Exam  Constitutional:       Appearance: Normal appearance.   Genitourinary:      Vulva normal.   Pulmonary:      Effort: Pulmonary effort is normal.   Neurological:      Mental Status: She is alert.   Psychiatric:         Mood and Affect: Mood normal.         Behavior: Behavior normal.         Thought Content: Thought content normal.         Judgment: Judgment normal.         Problem List Items Addressed This Visit       Vaginal discharge - " Primary    Overview     1/21: recollected  1/17: still pending   12/18: no BV/yeast  11/13/24: BV  7/2/24: yeast, BV  5/6/24: no yeast   4/11/24: no yeast   Discussed with patient about definition of recurrent BV; that she does not yet meet that definition   -encouraged patient to work on good vaginal hygiene: cotton underwear, no underwear at night, no douching, no soap near vagina            Relevant Orders    Vaginitis Gram Stain For Bacterial Vaginosis + Yeast    HSIL (high grade squamous intraepithelial lesion) on Pap smear of cervix    Overview     7/2/24 WNL --> pap with HPV testing 7/25 12/22/21 LEEP WNL  8/4/21 LEEP with + margins   7/21/21 JAYDEN II HSIL colpo   6/29/21 HSIL HPV+  5/4/20 LSIL, HPV+          Other Visit Diagnoses       Vaginal odor              RTC for annual or prn     LEATHA Arroyo-CORNELL

## 2025-01-20 NOTE — PROGRESS NOTES
I reviewed the resident/fellow's documentation and discussed the patient with the resident/fellow. I agree with the resident/fellow's medical decision making as documented in the note.    Jv Kent MD

## 2025-01-21 ENCOUNTER — OFFICE VISIT (OUTPATIENT)
Dept: OBSTETRICS AND GYNECOLOGY | Facility: CLINIC | Age: 30
End: 2025-01-21
Payer: MEDICAID

## 2025-01-21 ENCOUNTER — APPOINTMENT (OUTPATIENT)
Dept: LAB | Facility: LAB | Age: 30
End: 2025-01-21
Payer: MEDICAID

## 2025-01-21 VITALS
DIASTOLIC BLOOD PRESSURE: 65 MMHG | HEART RATE: 93 BPM | BODY MASS INDEX: 28.71 KG/M2 | SYSTOLIC BLOOD PRESSURE: 95 MMHG | WEIGHT: 177.9 LBS

## 2025-01-21 DIAGNOSIS — R87.613 HSIL (HIGH GRADE SQUAMOUS INTRAEPITHELIAL LESION) ON PAP SMEAR OF CERVIX: ICD-10-CM

## 2025-01-21 DIAGNOSIS — N89.8 VAGINAL ODOR: ICD-10-CM

## 2025-01-21 DIAGNOSIS — N89.8 VAGINAL DISCHARGE: Primary | ICD-10-CM

## 2025-01-21 PROBLEM — A59.01 TRICHOMONAL VULVOVAGINITIS: Status: RESOLVED | Noted: 2025-01-17 | Resolved: 2025-01-21

## 2025-01-21 LAB — HBV SURFACE AB SER-ACNC: >1000 MIU/ML

## 2025-01-21 PROCEDURE — 86803 HEPATITIS C AB TEST: CPT

## 2025-01-21 PROCEDURE — 87389 HIV-1 AG W/HIV-1&-2 AB AG IA: CPT

## 2025-01-21 PROCEDURE — 86706 HEP B SURFACE ANTIBODY: CPT

## 2025-01-21 PROCEDURE — 86780 TREPONEMA PALLIDUM: CPT

## 2025-01-21 PROCEDURE — 99203 OFFICE O/P NEW LOW 30 MIN: CPT | Performed by: ADVANCED PRACTICE MIDWIFE

## 2025-01-21 PROCEDURE — 1036F TOBACCO NON-USER: CPT | Performed by: ADVANCED PRACTICE MIDWIFE

## 2025-01-21 PROCEDURE — 87205 SMEAR GRAM STAIN: CPT | Performed by: ADVANCED PRACTICE MIDWIFE

## 2025-01-21 PROCEDURE — 99213 OFFICE O/P EST LOW 20 MIN: CPT | Performed by: ADVANCED PRACTICE MIDWIFE

## 2025-01-21 ASSESSMENT — ENCOUNTER SYMPTOMS
CONSTITUTIONAL NEGATIVE: 0
PSYCHIATRIC NEGATIVE: 0
EYES NEGATIVE: 0
ENDOCRINE NEGATIVE: 0
HEMATOLOGIC/LYMPHATIC NEGATIVE: 0
GASTROINTESTINAL NEGATIVE: 0
ALLERGIC/IMMUNOLOGIC NEGATIVE: 0
MUSCULOSKELETAL NEGATIVE: 0
NEUROLOGICAL NEGATIVE: 0
CARDIOVASCULAR NEGATIVE: 0
RESPIRATORY NEGATIVE: 0

## 2025-01-21 ASSESSMENT — PAIN SCALES - GENERAL: PAINLEVEL_OUTOF10: 0-NO PAIN

## 2025-01-22 ENCOUNTER — TELEPHONE (OUTPATIENT)
Dept: OBSTETRICS AND GYNECOLOGY | Facility: CLINIC | Age: 30
End: 2025-01-22
Payer: MEDICAID

## 2025-01-22 ENCOUNTER — TELEPHONE (OUTPATIENT)
Facility: HOSPITAL | Age: 30
End: 2025-01-22
Payer: MEDICAID

## 2025-01-22 DIAGNOSIS — Z11.3 SCREENING EXAMINATION FOR STI: Primary | ICD-10-CM

## 2025-01-22 DIAGNOSIS — N89.8 VAGINAL DISCHARGE: Primary | ICD-10-CM

## 2025-01-22 LAB
CLUE CELLS VAG LPF-#/AREA: ABNORMAL /[LPF]
HCV AB SER QL: NONREACTIVE
HIV 1+2 AB+HIV1 P24 AG SERPL QL IA: NONREACTIVE
NUGENT SCORE: 8
TREPONEMA PALLIDUM IGG+IGM AB [PRESENCE] IN SERUM OR PLASMA BY IMMUNOASSAY: NONREACTIVE
YEAST VAG WET PREP-#/AREA: ABNORMAL

## 2025-01-22 RX ORDER — METRONIDAZOLE 7.5 MG/G
GEL VAGINAL DAILY
Qty: 70 G | Refills: 0 | Status: SHIPPED | OUTPATIENT
Start: 2025-01-22 | End: 2025-01-27

## 2025-01-22 NOTE — TELEPHONE ENCOUNTER
Pt called and  states another provider did  gc ct on her but specimen was lost and she wants to know did Katie nava send any- Katie nava did not, but she placed order and pt can go to any  lab and drop off urine

## 2025-01-24 ENCOUNTER — LAB (OUTPATIENT)
Dept: LAB | Facility: LAB | Age: 30
End: 2025-01-24
Payer: MEDICAID

## 2025-01-24 DIAGNOSIS — Z11.3 SCREENING EXAMINATION FOR STI: ICD-10-CM

## 2025-01-24 PROCEDURE — 87491 CHLMYD TRACH DNA AMP PROBE: CPT

## 2025-01-24 PROCEDURE — 87661 TRICHOMONAS VAGINALIS AMPLIF: CPT

## 2025-01-24 PROCEDURE — 87591 N.GONORRHOEAE DNA AMP PROB: CPT

## 2025-02-06 ENCOUNTER — HOSPITAL ENCOUNTER (EMERGENCY)
Facility: HOSPITAL | Age: 30
Discharge: LEFT BEFORE DISCHARGE | End: 2025-02-06
Payer: MEDICAID

## 2025-02-06 VITALS — TEMPERATURE: 97.5 F

## 2025-02-06 PROCEDURE — 4500999001 HC ED NO CHARGE

## 2025-02-17 ENCOUNTER — APPOINTMENT (OUTPATIENT)
Dept: OPHTHALMOLOGY | Facility: CLINIC | Age: 30
End: 2025-02-17
Payer: MEDICAID

## 2025-05-04 ENCOUNTER — APPOINTMENT (OUTPATIENT)
Dept: RADIOLOGY | Facility: HOSPITAL | Age: 30
End: 2025-05-04
Payer: MEDICAID

## 2025-05-04 ENCOUNTER — HOSPITAL ENCOUNTER (EMERGENCY)
Facility: HOSPITAL | Age: 30
Discharge: HOME | End: 2025-05-04
Attending: EMERGENCY MEDICINE
Payer: MEDICAID

## 2025-05-04 VITALS
HEIGHT: 66 IN | DIASTOLIC BLOOD PRESSURE: 84 MMHG | SYSTOLIC BLOOD PRESSURE: 125 MMHG | HEART RATE: 82 BPM | BODY MASS INDEX: 28.93 KG/M2 | WEIGHT: 180 LBS | OXYGEN SATURATION: 97 % | TEMPERATURE: 98.1 F | RESPIRATION RATE: 18 BRPM

## 2025-05-04 DIAGNOSIS — R51.9 ACUTE NONINTRACTABLE HEADACHE, UNSPECIFIED HEADACHE TYPE: Primary | ICD-10-CM

## 2025-05-04 LAB
GLUCOSE BLD MANUAL STRIP-MCNC: 89 MG/DL (ref 74–99)
PREGNANCY TEST URINE, POC: NEGATIVE

## 2025-05-04 PROCEDURE — 96375 TX/PRO/DX INJ NEW DRUG ADDON: CPT

## 2025-05-04 PROCEDURE — 70450 CT HEAD/BRAIN W/O DYE: CPT

## 2025-05-04 PROCEDURE — 70450 CT HEAD/BRAIN W/O DYE: CPT | Performed by: RADIOLOGY

## 2025-05-04 PROCEDURE — 99284 EMERGENCY DEPT VISIT MOD MDM: CPT | Performed by: EMERGENCY MEDICINE

## 2025-05-04 PROCEDURE — 96374 THER/PROPH/DIAG INJ IV PUSH: CPT

## 2025-05-04 PROCEDURE — 2500000001 HC RX 250 WO HCPCS SELF ADMINISTERED DRUGS (ALT 637 FOR MEDICARE OP): Mod: SE

## 2025-05-04 PROCEDURE — 2500000004 HC RX 250 GENERAL PHARMACY W/ HCPCS (ALT 636 FOR OP/ED): Mod: JZ,SE

## 2025-05-04 PROCEDURE — 81025 URINE PREGNANCY TEST: CPT

## 2025-05-04 PROCEDURE — 99284 EMERGENCY DEPT VISIT MOD MDM: CPT | Mod: 25 | Performed by: EMERGENCY MEDICINE

## 2025-05-04 PROCEDURE — 96372 THER/PROPH/DIAG INJ SC/IM: CPT

## 2025-05-04 PROCEDURE — 82947 ASSAY GLUCOSE BLOOD QUANT: CPT

## 2025-05-04 RX ORDER — ACETAMINOPHEN 325 MG/1
650 TABLET ORAL ONCE
Status: COMPLETED | OUTPATIENT
Start: 2025-05-04 | End: 2025-05-04

## 2025-05-04 RX ORDER — NAPROXEN 500 MG/1
500 TABLET ORAL ONCE
Status: DISCONTINUED | OUTPATIENT
Start: 2025-05-04 | End: 2025-05-04

## 2025-05-04 RX ORDER — KETOROLAC TROMETHAMINE 30 MG/ML
30 INJECTION, SOLUTION INTRAMUSCULAR; INTRAVENOUS ONCE
Status: COMPLETED | OUTPATIENT
Start: 2025-05-04 | End: 2025-05-04

## 2025-05-04 RX ORDER — METOCLOPRAMIDE HYDROCHLORIDE 5 MG/ML
10 INJECTION INTRAMUSCULAR; INTRAVENOUS ONCE
Status: COMPLETED | OUTPATIENT
Start: 2025-05-04 | End: 2025-05-04

## 2025-05-04 RX ORDER — DIPHENHYDRAMINE HYDROCHLORIDE 50 MG/ML
12.5 INJECTION, SOLUTION INTRAMUSCULAR; INTRAVENOUS ONCE
Status: COMPLETED | OUTPATIENT
Start: 2025-05-04 | End: 2025-05-04

## 2025-05-04 RX ADMIN — DIPHENHYDRAMINE HYDROCHLORIDE 12.5 MG: 50 INJECTION INTRAMUSCULAR; INTRAVENOUS at 16:46

## 2025-05-04 RX ADMIN — ACETAMINOPHEN 650 MG: 325 TABLET, FILM COATED ORAL at 14:55

## 2025-05-04 RX ADMIN — KETOROLAC TROMETHAMINE 30 MG: 30 INJECTION, SOLUTION INTRAMUSCULAR; INTRAVENOUS at 15:00

## 2025-05-04 RX ADMIN — SODIUM CHLORIDE 1000 ML: 0.9 INJECTION, SOLUTION INTRAVENOUS at 16:45

## 2025-05-04 RX ADMIN — METOCLOPRAMIDE 10 MG: 5 INJECTION, SOLUTION INTRAMUSCULAR; INTRAVENOUS at 16:45

## 2025-05-04 ASSESSMENT — LIFESTYLE VARIABLES
HAVE YOU EVER FELT YOU SHOULD CUT DOWN ON YOUR DRINKING: NO
EVER HAD A DRINK FIRST THING IN THE MORNING TO STEADY YOUR NERVES TO GET RID OF A HANGOVER: NO
TOTAL SCORE: 0
EVER FELT BAD OR GUILTY ABOUT YOUR DRINKING: NO
HAVE PEOPLE ANNOYED YOU BY CRITICIZING YOUR DRINKING: NO

## 2025-05-04 ASSESSMENT — PAIN SCALES - GENERAL
PAINLEVEL_OUTOF10: 8
PAINLEVEL_OUTOF10: 10 - WORST POSSIBLE PAIN

## 2025-05-04 ASSESSMENT — VISUAL ACUITY: OS: 20/25

## 2025-05-04 ASSESSMENT — PAIN DESCRIPTION - DESCRIPTORS: DESCRIPTORS: ACHING

## 2025-05-04 ASSESSMENT — PAIN DESCRIPTION - LOCATION: LOCATION: HEAD

## 2025-05-04 ASSESSMENT — PAIN DESCRIPTION - PAIN TYPE: TYPE: ACUTE PAIN

## 2025-05-04 ASSESSMENT — PAIN - FUNCTIONAL ASSESSMENT
PAIN_FUNCTIONAL_ASSESSMENT: 0-10
PAIN_FUNCTIONAL_ASSESSMENT: 0-10

## 2025-05-04 NOTE — ED PROVIDER NOTES
EMERGENCY DEPARTMENT ENCOUNTER      Pt Name: Quyen Gilbert  MRN: [id number]  Birthdate 1995  Date of evaluation: 5/4/2025  Provider: Dutch Hyde DO    CHIEF COMPLAINT       Chief Complaint   Patient presents with    Headache    Blurred Vision         HISTORY OF PRESENT ILLNESS    30-year-old female comes emergency with headache, describes it as pressure behind both of her eyes, and blurred vision over the last 3 weeks.  She describes a constant pressure over the last 2 weeks, treatments at home have not been helping including Tylenol and Motrin.  No injuries or trauma to the head or eye.  No double vision.  No nausea or vomiting.  No personal or family history of migraine disorder.  No says she used to wear glasses when she was younger but no longer does.  No family history of brain aneurysms, brain cancer.  Patient does not have any chest pain, shortness of breath currently, no abdominal pain, no facial pressure, no sick symptoms currently.  No fevers.      History provided by:  Patient      Nursing Notes were reviewed.    PAST MEDICAL HISTORY   Medical History[1]      SURGICAL HISTORY     Surgical History[2]      CURRENT MEDICATIONS       Discharge Medication List as of 5/4/2025  4:57 PM        CONTINUE these medications which have NOT CHANGED    Details   albuterol 90 mcg/actuation inhaler Inhale 2 puffs every 6 hours if needed for wheezing., Starting Tue 11/12/2024, Until Wed 11/12/2025 at 2359, Print             ALLERGIES     Patient has no known allergies.    FAMILY HISTORY     Family History[3]       SOCIAL HISTORY     Social History[4]    SCREENINGS                        PHYSICAL EXAM    (up to 7 for level 4, 8 or more for level 5)     ED Triage Vitals [05/04/25 1426]   Temperature Heart Rate Respirations BP   36.7 °C (98.1 °F) 82 18 125/84      Pulse Ox Temp src Heart Rate Source Patient Position   97 % -- -- --      BP Location FiO2 (%)     -- --       Physical Exam  Vitals and nursing note  reviewed.   Constitutional:       Appearance: Normal appearance.   HENT:      Head: Normocephalic and atraumatic.   Eyes:      General: No scleral icterus.        Right eye: No discharge.         Left eye: No discharge.      Extraocular Movements: Extraocular movements intact.      Conjunctiva/sclera: Conjunctivae normal.      Pupils: Pupils are equal, round, and reactive to light.   Cardiovascular:      Rate and Rhythm: Normal rate and regular rhythm.   Pulmonary:      Effort: Pulmonary effort is normal. No respiratory distress.   Abdominal:      General: There is no distension.   Musculoskeletal:      Cervical back: Normal range of motion. No rigidity.   Skin:     General: Skin is warm and dry.   Neurological:      Mental Status: She is alert. Mental status is at baseline.      Comments: CN II-XII intact, strength 5/5, sensation intact throughout.  normal finger-nose and heel shin testing bilaterally.   Psychiatric:         Mood and Affect: Mood normal.         Behavior: Behavior normal.          DIAGNOSTIC RESULTS     LABS:  Labs Reviewed   POCT GLUCOSE - Normal       Result Value    POCT Glucose 89     POCT PREGNANCY, URINE - Normal    Preg Test, Ur Negative         All other labs were within normal range or not returned as of this dictation.    Imaging  CT head wo IV contrast   Final Result   No acute intracranial hemorrhage or mass effect.        I personally reviewed the images/study and I agree with the findings   as stated by Cesar Gutierrez MD. This study was interpreted at   [facility], [place], [place].        MACRO:   None        Signed by: Vicky Diez 5/4/2025 4:34 PM   Dictation workstation:   [id number]           Procedures  Procedures     EMERGENCY DEPARTMENT COURSE/MDM:     Diagnoses as of 05/04/25 1701   Acute nonintractable headache, unspecified headache type        Medical Decision Making  30-year-old female comes emergency with headache behind bilateral eyes.  No  focal neurological deficits on exam.  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  Vision acuity 20/25 in the left eye.  I do believe there is a component of poor eyesight leading to her headache given that when I was standing close to her she said it was not blurry but as I backed away she said it was getting more and more blurry.  During her pregnancy test here, administered intramuscular Toradol, oral Tylenol, patient had no improvement however after getting Benadryl and Reglan and a bolus she did have some improvement of her headache.  Patient was discharged, given a PCP referral for further workup and management of her headache, I did recommend she follow-up with an optometrist as well.  CT head showed no acute intracranial process.  Patient discharged at this time, return for any new, concerning or worsening symptoms        Patient and or family in agreement and understanding of treatment plan.  All questions answered.      I reviewed the case with the attending ED physician. The attending ED physician agrees with the plan. Patient and/or patient´s representative was counseled regarding labs, imaging, likely diagnosis, and plan. All questions were answered.    ED Medications administered this visit:    Medications   acetaminophen (Tylenol) tablet 650 mg (650 mg oral Given 5/4/25 1455)   ketorolac (Toradol) injection 30 mg (30 mg intramuscular Given 5/4/25 1500)   diphenhydrAMINE (BENADryl) injection 12.5 mg (12.5 mg intravenous Given 5/4/25 1646)   metoclopramide (Reglan) injection 10 mg (10 mg intravenous Given 5/4/25 1645)   sodium chloride 0.9 % bolus 1,000 mL (0 mL intravenous Stopped 5/4/25 1659)       Final Impression:   1. Acute nonintractable headache, unspecified headache type          (Please note that portions of this note were completed with a voice recognition program.  Efforts were made to edit the dictations but occasionally words are mis-transcribed.)          [1]   Past Medical History:  Diagnosis Date    Abnormal Pap smear of cervix     Condyloma acuminatum 2025    Hx of gonorrhea 2018    Hx of trichomoniasis     Personal history of other infectious and parasitic diseases 2018    History of candidiasis   [2]   Past Surgical History:  Procedure Laterality Date     SECTION, LOW TRANSVERSE  2017   [3]   Family History  Problem Relation Name Age of Onset    No Known Problems Mother      Kidney cancer Father      Pancreatic cancer Father      Kidney cancer Son      Breast cancer Maternal Grandmother  50 - 59    Ovarian cancer Neg Hx      Colon cancer Neg Hx     [4]   Social History  Socioeconomic History    Marital status: Single   Tobacco Use    Smoking status: Never    Smokeless tobacco: Never   Vaping Use    Vaping status: Never Used   Substance and Sexual Activity    Alcohol use: Yes     Alcohol/week: 1.0 standard drink of alcohol     Types: 1 Glasses of wine per week    Drug use: Never    Sexual activity: Yes     Partners: Male     Birth control/protection: None     Social Drivers of Health     Financial Resource Strain: Low Risk  (2020)    Received from [employer]    Overall Financial Resource Strain (CARDIA)     Difficulty of Paying Living Expenses: Not hard at all   Food Insecurity: No Food Insecurity (2020)    Received from [employer]    Hunger Vital Sign     Worried About Running Out of Food in the Last Year: Never true     Ran Out of Food in the Last Year: Never true   Transportation Needs: Low Risk  (2024)    [facility]    [place] Health Risk Assessment (HRA)     In the past year, have you had trouble getting to medical appointments or getting things you need because of transportation?: No   Housing Stability: Low Risk  (2024)    Received from [facility]    [place] Health Risk Assessment (HRA)     Describe your current living situation.: I have a steady place to live     Does your current living situation have any  of the following problems? (CHOOSE ALL THAT APPLY): None of the above        Dutch Hyde, DO  Resident  05/04/25 5838

## 2025-05-04 NOTE — DISCHARGE INSTRUCTIONS
Seek immediate medical attention if you develop: worsening headache, nausea, vomiting, confusion, weakness, loss of motion in your arms or legs, loss of control of your urine or stool, difficulty waking from sleep, neck pain, fever, or any new or worsening symptoms.  Follow-up with an optometrist.

## 2025-07-07 ENCOUNTER — HOSPITAL ENCOUNTER (EMERGENCY)
Facility: HOSPITAL | Age: 30
Discharge: HOME | End: 2025-07-07
Payer: MEDICAID

## 2025-07-07 VITALS
OXYGEN SATURATION: 97 % | HEART RATE: 89 BPM | SYSTOLIC BLOOD PRESSURE: 119 MMHG | BODY MASS INDEX: 28.93 KG/M2 | HEIGHT: 66 IN | WEIGHT: 180 LBS | TEMPERATURE: 97.2 F | DIASTOLIC BLOOD PRESSURE: 80 MMHG | RESPIRATION RATE: 16 BRPM

## 2025-07-07 DIAGNOSIS — N76.0 BACTERIAL VAGINITIS: Primary | ICD-10-CM

## 2025-07-07 DIAGNOSIS — B96.89 BACTERIAL VAGINITIS: Primary | ICD-10-CM

## 2025-07-07 LAB
APPEARANCE UR: ABNORMAL
BILIRUB UR STRIP.AUTO-MCNC: NEGATIVE MG/DL
C TRACH RRNA SPEC QL NAA+PROBE: NEGATIVE
CLUE CELLS SPEC QL WET PREP: PRESENT
COLOR UR: ABNORMAL
GLUCOSE UR STRIP.AUTO-MCNC: NORMAL MG/DL
HCV AB SER QL: NONREACTIVE
HIV 1+2 AB+HIV1 P24 AG SERPL QL IA: NONREACTIVE
HOLD SPECIMEN: NORMAL
HSV1 DNA SKIN QL NAA+PROBE: NOT DETECTED
HSV2 DNA SKIN QL NAA+PROBE: NOT DETECTED
KETONES UR STRIP.AUTO-MCNC: NEGATIVE MG/DL
LEUKOCYTE ESTERASE UR QL STRIP.AUTO: NEGATIVE
MUCOUS THREADS #/AREA URNS AUTO: NORMAL /LPF
N GONORRHOEA DNA SPEC QL PROBE+SIG AMP: NEGATIVE
NITRITE UR QL STRIP.AUTO: NEGATIVE
PH UR STRIP.AUTO: 5.5 [PH]
PREGNANCY TEST URINE, POC: NEGATIVE
PROT UR STRIP.AUTO-MCNC: ABNORMAL MG/DL
RBC # UR STRIP.AUTO: NEGATIVE MG/DL
RBC #/AREA URNS AUTO: NORMAL /HPF
SP GR UR STRIP.AUTO: 1.03
SQUAMOUS #/AREA URNS AUTO: NORMAL /HPF
T VAGINALIS RRNA SPEC QL NAA+PROBE: NEGATIVE
T VAGINALIS SPEC QL WET PREP: ABNORMAL
TREPONEMA PALLIDUM IGG+IGM AB [PRESENCE] IN SERUM OR PLASMA BY IMMUNOASSAY: NONREACTIVE
TRICHOMONAS REFLEX COMMENT: ABNORMAL
UROBILINOGEN UR STRIP.AUTO-MCNC: ABNORMAL MG/DL
WBC #/AREA URNS AUTO: NORMAL /HPF
WBC VAG QL WET PREP: ABNORMAL
YEAST VAG QL WET PREP: ABNORMAL

## 2025-07-07 PROCEDURE — 86780 TREPONEMA PALLIDUM: CPT | Performed by: EMERGENCY MEDICINE

## 2025-07-07 PROCEDURE — 81025 URINE PREGNANCY TEST: CPT | Performed by: EMERGENCY MEDICINE

## 2025-07-07 PROCEDURE — 86803 HEPATITIS C AB TEST: CPT | Performed by: EMERGENCY MEDICINE

## 2025-07-07 PROCEDURE — 87661 TRICHOMONAS VAGINALIS AMPLIF: CPT | Performed by: EMERGENCY MEDICINE

## 2025-07-07 PROCEDURE — 2500000001 HC RX 250 WO HCPCS SELF ADMINISTERED DRUGS (ALT 637 FOR MEDICARE OP): Mod: SE | Performed by: EMERGENCY MEDICINE

## 2025-07-07 PROCEDURE — 87389 HIV-1 AG W/HIV-1&-2 AB AG IA: CPT | Performed by: EMERGENCY MEDICINE

## 2025-07-07 PROCEDURE — 87491 CHLMYD TRACH DNA AMP PROBE: CPT | Performed by: EMERGENCY MEDICINE

## 2025-07-07 PROCEDURE — 99283 EMERGENCY DEPT VISIT LOW MDM: CPT

## 2025-07-07 PROCEDURE — 99284 EMERGENCY DEPT VISIT MOD MDM: CPT | Performed by: EMERGENCY MEDICINE

## 2025-07-07 PROCEDURE — 36415 COLL VENOUS BLD VENIPUNCTURE: CPT | Performed by: EMERGENCY MEDICINE

## 2025-07-07 PROCEDURE — 87529 HSV DNA AMP PROBE: CPT | Performed by: EMERGENCY MEDICINE

## 2025-07-07 PROCEDURE — 81001 URINALYSIS AUTO W/SCOPE: CPT | Performed by: EMERGENCY MEDICINE

## 2025-07-07 PROCEDURE — 87210 SMEAR WET MOUNT SALINE/INK: CPT | Mod: 59 | Performed by: EMERGENCY MEDICINE

## 2025-07-07 RX ORDER — METRONIDAZOLE 500 MG/1
2000 TABLET ORAL ONCE
Status: COMPLETED | OUTPATIENT
Start: 2025-07-07 | End: 2025-07-07

## 2025-07-07 RX ADMIN — METRONIDAZOLE 2000 MG: 500 TABLET ORAL at 09:27

## 2025-07-07 NOTE — ED PROVIDER NOTES
HPI   Chief Complaint   Patient presents with    Vaginal Discharge       Patient is a 30-year-old female presenting to the ED with concerns for STI exposure.  Patient states that for the last week she has had increased thick, foul-smelling vaginal discharge.  Over the last 2 days she has also had urinary frequency with dysuria.  She is also concerned for a bump on her labia. After shaving last week she noticed a bump that she thought was razor burn, but developed some burning to that area and is now concerned she might have genital herpes. She denies having genital herpes before or known exposure to this. She denies abdominal or pelvic pain, nausea, vomiting, diarrhea, fever or chills.    ROS negative unless noted above.              Patient History   Medical History[1]  Surgical History[2]  Family History[3]  Social History[4]    Physical Exam   ED Triage Vitals [07/07/25 0216]   Temperature Heart Rate Respirations BP   36.2 °C (97.2 °F) 89 16 119/80      Pulse Ox Temp Source Heart Rate Source Patient Position   97 % Temporal -- --      BP Location FiO2 (%)     -- --       Physical Exam  Vitals and nursing note reviewed. Exam conducted with a chaperone present.   Constitutional:       General: She is not in acute distress.     Appearance: She is well-developed.   HENT:      Head: Normocephalic and atraumatic.   Eyes:      Conjunctiva/sclera: Conjunctivae normal.   Cardiovascular:      Rate and Rhythm: Normal rate and regular rhythm.      Heart sounds: No murmur heard.  Pulmonary:      Effort: Pulmonary effort is normal. No respiratory distress.      Breath sounds: Normal breath sounds.   Abdominal:      Palpations: Abdomen is soft.      Tenderness: There is no abdominal tenderness.   Genitourinary:     Vagina: Normal.      Cervix: Normal.      Uterus: Normal.       Adnexa: Right adnexa normal and left adnexa normal.      Rectum: Normal.   Musculoskeletal:         General: No swelling.      Cervical back: Neck  Patient edematous on exam with BETO now worsening, was given lasix yesterday with good response 2.2L.   Will avoid diuresis difficult to access true intra-vascular volume with likely high skin losses insensible.   Will watch patient closely off lasix and off fluids.    supple.   Skin:     General: Skin is warm and dry.      Capillary Refill: Capillary refill takes less than 2 seconds.   Neurological:      Mental Status: She is alert.   Psychiatric:         Mood and Affect: Mood normal.           ED Course & MDM                  No data recorded     Primitivo Coma Scale Score: 15 (07/07/25 0217 : Jenna Canas RN)                           Medical Decision Making  Patient is a well, nontoxic-appearing 30-year-old female sitting in bed comfortably.  Upon arrival to the ED her vital signs were within normal limits.  On exam her heart sounds were normal and lung sounds were clear.  Point-of-care urine pregnancy test was negative.  UA was ordered. Pelvic exam was complete by myself with RN chaperone at bedside and vaginal cultures were obtained.  Exam revealed closed, normal cervical os with no cervical or adnexal tenderness on bimanual exam.  Patient has an area of irritation to the right outer labial region consistent with razor burn, she has no herpetic type lesions however, viral swab culture was taken from this area anyway due to patient's concern although I have very low concern for this.  UA not concerning for acute cystitis.  Vaginal culture positive for clue cells, yeast and trichomoniasis were negative.  Patient requested one-time treatment of the BV rather than a prescription so she was given 2000mg p.o. metronidazole x 1 dose while in the ED. Advised that other cultures should result in MyChart within the next 2-3 days and she will be notified if anything is abnormal. Recommended that she follow up with her PCP within the next week but to also return to the ED if she develops any new concerning or worsening symptoms. Patient remained hemodynamically stable while in the ED.     *Please note that portions of this note may have been completed with a voice recognition program.  Efforts were made to edit the dictations but occasionally, words are  mis-transcribed.        Procedure  Procedures         [1]   Past Medical History:  Diagnosis Date    Abnormal Pap smear of cervix     Condyloma acuminatum 2025    Hx of gonorrhea 2018    Hx of trichomoniasis     Personal history of other infectious and parasitic diseases 2018    History of candidiasis   [2]   Past Surgical History:  Procedure Laterality Date     SECTION, LOW TRANSVERSE  2017   [3]   Family History  Problem Relation Name Age of Onset    No Known Problems Mother      Kidney cancer Father      Pancreatic cancer Father      Kidney cancer Son      Breast cancer Maternal Grandmother  50 - 59    Ovarian cancer Neg Hx      Colon cancer Neg Hx     [4]   Social History  Tobacco Use    Smoking status: Never    Smokeless tobacco: Never   Vaping Use    Vaping status: Never Used   Substance Use Topics    Alcohol use: Yes     Alcohol/week: 1.0 standard drink of alcohol     Types: 1 Glasses of wine per week    Drug use: Never        LEATHA Medeiros-CNP  25 0923

## 2025-08-17 ENCOUNTER — HOSPITAL ENCOUNTER (EMERGENCY)
Facility: HOSPITAL | Age: 30
Discharge: HOME | End: 2025-08-17
Payer: MEDICAID

## 2025-08-17 ENCOUNTER — APPOINTMENT (OUTPATIENT)
Dept: RADIOLOGY | Facility: HOSPITAL | Age: 30
End: 2025-08-17
Payer: MEDICAID

## 2025-08-17 ENCOUNTER — CLINICAL SUPPORT (OUTPATIENT)
Dept: EMERGENCY MEDICINE | Facility: HOSPITAL | Age: 30
End: 2025-08-17
Payer: MEDICAID

## 2025-08-17 VITALS
OXYGEN SATURATION: 98 % | WEIGHT: 186 LBS | RESPIRATION RATE: 18 BRPM | BODY MASS INDEX: 29.89 KG/M2 | DIASTOLIC BLOOD PRESSURE: 77 MMHG | HEART RATE: 92 BPM | TEMPERATURE: 98.4 F | HEIGHT: 66 IN | SYSTOLIC BLOOD PRESSURE: 115 MMHG

## 2025-08-17 DIAGNOSIS — R06.2 WHEEZING: Primary | ICD-10-CM

## 2025-08-17 LAB
ATRIAL RATE: 73 BPM
P AXIS: 62 DEGREES
P OFFSET: 189 MS
P ONSET: 141 MS
PR INTERVAL: 154 MS
Q ONSET: 218 MS
QRS COUNT: 12 BEATS
QRS DURATION: 82 MS
QT INTERVAL: 390 MS
QTC CALCULATION(BAZETT): 429 MS
QTC FREDERICIA: 416 MS
R AXIS: 30 DEGREES
T AXIS: 41 DEGREES
T OFFSET: 413 MS
VENTRICULAR RATE: 73 BPM

## 2025-08-17 PROCEDURE — 94640 AIRWAY INHALATION TREATMENT: CPT

## 2025-08-17 PROCEDURE — 71046 X-RAY EXAM CHEST 2 VIEWS: CPT

## 2025-08-17 PROCEDURE — 2500000002 HC RX 250 W HCPCS SELF ADMINISTERED DRUGS (ALT 637 FOR MEDICARE OP, ALT 636 FOR OP/ED): Mod: SE | Performed by: NURSE PRACTITIONER

## 2025-08-17 PROCEDURE — 2500000004 HC RX 250 GENERAL PHARMACY W/ HCPCS (ALT 636 FOR OP/ED): Mod: SE | Performed by: NURSE PRACTITIONER

## 2025-08-17 PROCEDURE — 99285 EMERGENCY DEPT VISIT HI MDM: CPT | Mod: 25

## 2025-08-17 PROCEDURE — 71046 X-RAY EXAM CHEST 2 VIEWS: CPT | Performed by: RADIOLOGY

## 2025-08-17 PROCEDURE — 93005 ELECTROCARDIOGRAM TRACING: CPT

## 2025-08-17 RX ORDER — ALBUTEROL SULFATE 90 UG/1
2 INHALANT RESPIRATORY (INHALATION) EVERY 4 HOURS PRN
Qty: 18 G | Refills: 0 | Status: SHIPPED | OUTPATIENT
Start: 2025-08-17 | End: 2025-09-16

## 2025-08-17 RX ORDER — ALBUTEROL SULFATE 0.83 MG/ML
2.5 SOLUTION RESPIRATORY (INHALATION) EVERY 6 HOURS PRN
Qty: 100 ML | Refills: 0 | Status: SHIPPED | OUTPATIENT
Start: 2025-08-17 | End: 2025-09-16

## 2025-08-17 RX ORDER — PREDNISONE 20 MG/1
40 TABLET ORAL DAILY
Qty: 10 TABLET | Refills: 0 | Status: SHIPPED | OUTPATIENT
Start: 2025-08-17 | End: 2025-08-22

## 2025-08-17 RX ORDER — IPRATROPIUM BROMIDE AND ALBUTEROL SULFATE 2.5; .5 MG/3ML; MG/3ML
3 SOLUTION RESPIRATORY (INHALATION) ONCE
Status: COMPLETED | OUTPATIENT
Start: 2025-08-17 | End: 2025-08-17

## 2025-08-17 RX ORDER — PREDNISONE 20 MG/1
60 TABLET ORAL ONCE
Status: COMPLETED | OUTPATIENT
Start: 2025-08-17 | End: 2025-08-17

## 2025-08-17 RX ORDER — ALBUTEROL SULFATE 0.83 MG/ML
2.5 SOLUTION RESPIRATORY (INHALATION) ONCE
Status: COMPLETED | OUTPATIENT
Start: 2025-08-17 | End: 2025-08-17

## 2025-08-17 RX ADMIN — ALBUTEROL SULFATE 2.5 MG: 2.5 SOLUTION RESPIRATORY (INHALATION) at 12:10

## 2025-08-17 RX ADMIN — IPRATROPIUM BROMIDE AND ALBUTEROL SULFATE 3 ML: .5; 3 SOLUTION RESPIRATORY (INHALATION) at 12:10

## 2025-08-17 RX ADMIN — PREDNISONE 60 MG: 20 TABLET ORAL at 12:10

## 2025-08-17 RX ADMIN — IPRATROPIUM BROMIDE AND ALBUTEROL SULFATE 3 ML: .5; 3 SOLUTION RESPIRATORY (INHALATION) at 13:48

## 2025-08-17 ASSESSMENT — ENCOUNTER SYMPTOMS
SORE THROAT: 0
SHORTNESS OF BREATH: 1
FEVER: 0
COUGH: 1
VOMITING: 0
CHILLS: 0
WHEEZING: 1
NAUSEA: 0
RHINORRHEA: 0

## 2025-08-17 ASSESSMENT — PAIN - FUNCTIONAL ASSESSMENT: PAIN_FUNCTIONAL_ASSESSMENT: 0-10

## 2025-08-17 ASSESSMENT — PAIN SCALES - GENERAL: PAINLEVEL_OUTOF10: 0 - NO PAIN

## 2025-09-12 ENCOUNTER — APPOINTMENT (OUTPATIENT)
Dept: OBSTETRICS AND GYNECOLOGY | Facility: CLINIC | Age: 30
End: 2025-09-12
Payer: MEDICAID

## 2025-09-17 ENCOUNTER — APPOINTMENT (OUTPATIENT)
Dept: OPHTHALMOLOGY | Facility: CLINIC | Age: 30
End: 2025-09-17
Payer: MEDICAID

## 2026-01-16 ENCOUNTER — APPOINTMENT (OUTPATIENT)
Dept: NEUROLOGY | Facility: CLINIC | Age: 31
End: 2026-01-16
Payer: MEDICAID